# Patient Record
(demographics unavailable — no encounter records)

---

## 2018-07-10 NOTE — CAT SCAN REPORT
FINAL REPORT



EXAM:  CT CHEST W CON



HISTORY:  Idiopathic pulmonary fibrosis 



TECHNIQUE:  CT of the chest was performed after the

administration of intravenous contrast. 



Reconstructions were included in the coronal and sagittal planes.



PRIORS:  None.



FINDINGS:  

Great vessels: The thoracic aorta is normal in caliber. The great

vessels are patent. Atherosclerotic calculi are seen in the

thoracic aorta.



Lungs and airways: No pleural effusion. There is a 4 millimeter

left upper lobe pulmonary nodule on series 3, image 82. No

airspace consolidation. The airways are patent. Extensive

peripheral reticular markings are seen throughout the lungs,

prominently in the lower lobes. There is bronchiectasis in the

lower lobes. Mild ground-glass opacities are seen within the

lower lobes as well as the right middle lobe. There is some

bronchiectasis in the right middle lobe. No evidence of

honeycombing. No pulmonary cysts. No micro nodules. Calcified

pleural plaques are seen along the posterior aspect of the right

upper lobe. 



Mediastinum, heart, pericardium: No mediastinal lymphadenopathy.

No cardiac chamber enlargement. No pericardial effusion. 



Thoracic inlet, chest wall, axilla: No chest wall masses. The

visualized portions of the thyroid gland demonstrate no focal

lesion. No axillary lymphadenopathy. 



Upper abdomen: Simple left upper pole renal cyst is seen. There

is a small hiatal hernia.



Bones: Degenerative changes are seen in the spine.



IMPRESSION:  





1. Chronic interstitial opacities may represent idiopathic

pulmonary fibrosis versus NSIP versus asbestosis versus

rheumatoid arthritis or other etiologies. 

2. 4 millimeter left upper lobe pulmonary nodule. In a low risk

patient, no further followup is needed. In a high risk patient

optional chest CT at 12 months. 

3. Calcified right posterior, upper pleural plaques.